# Patient Record
Sex: MALE | URBAN - METROPOLITAN AREA
[De-identification: names, ages, dates, MRNs, and addresses within clinical notes are randomized per-mention and may not be internally consistent; named-entity substitution may affect disease eponyms.]

---

## 2021-02-08 ENCOUNTER — APPOINTMENT (OUTPATIENT)
Dept: URBAN - METROPOLITAN AREA CLINIC 236 | Age: 21
Setting detail: DERMATOLOGY
End: 2021-02-09

## 2021-02-08 DIAGNOSIS — L72.8 OTHER FOLLICULAR CYSTS OF THE SKIN AND SUBCUTANEOUS TISSUE: ICD-10-CM

## 2021-02-08 DIAGNOSIS — L20.89 OTHER ATOPIC DERMATITIS: ICD-10-CM

## 2021-02-08 PROBLEM — L20.84 INTRINSIC (ALLERGIC) ECZEMA: Status: ACTIVE | Noted: 2021-02-08

## 2021-02-08 PROCEDURE — OTHER TREATMENT REGIMEN: OTHER

## 2021-02-08 PROCEDURE — 10060 I&D ABSCESS SIMPLE/SINGLE: CPT

## 2021-02-08 PROCEDURE — 99203 OFFICE O/P NEW LOW 30 MIN: CPT | Mod: 25

## 2021-02-08 PROCEDURE — OTHER COUNSELING: OTHER

## 2021-02-08 PROCEDURE — OTHER INCISION AND DRAINAGE: OTHER

## 2021-02-08 PROCEDURE — OTHER PRESCRIPTION: OTHER

## 2021-02-08 RX ORDER — CRISABOROLE 20 MG/G
OINTMENT TOPICAL
Qty: 1 | Refills: 0 | Status: ERX | COMMUNITY
Start: 2021-02-08

## 2021-02-08 ASSESSMENT — LOCATION SIMPLE DESCRIPTION DERM
LOCATION SIMPLE: GROIN
LOCATION SIMPLE: SCROTUM

## 2021-02-08 ASSESSMENT — LOCATION DETAILED DESCRIPTION DERM
LOCATION DETAILED: LEFT SCROTUM
LOCATION DETAILED: LEFT SUPRAPUBIC SKIN

## 2021-02-08 ASSESSMENT — LOCATION ZONE DERM
LOCATION ZONE: TRUNK
LOCATION ZONE: GENITALIA

## 2021-02-08 NOTE — PROCEDURE: INCISION AND DRAINAGE
Preparation Text: The area was prepped in the usual clean fashion.
Method: lancet
Render Postcare In Note?: No
Epidermal Closure: simple interrupted
Epidermal Sutures: 4-0 Ethilon
Post-Care Instructions: I reviewed with the patient in detail post-care instructions. Patient should keep wound covered and call the office should any redness, pain, swelling or worsening occur.
Lesion Type: Abscess
Suture Text: The incision was partially closed with
Consent was obtained and risks were reviewed including but not limited to delayed wound healing, infection, need for multiple I and D's, and pain.
Curette Text (Optional): After the contents were expressed a curette was used to partially remove the cyst wall.
Anesthesia Type: 1% lidocaine with epinephrine
Wound Care: Petrolatum
Size Of Lesion In Cm (Optional But May Be Required For Some Insurances): 0
Dressing: dry sterile dressing
Detail Level: Detailed

## 2021-02-08 NOTE — PROCEDURE: TREATMENT REGIMEN
Detail Level: Zone
Initiate Treatment: Eucrisa 2 % topical ointment \\nApply to affected area on the face once to twice a day for 21 days.